# Patient Record
Sex: FEMALE | Race: WHITE | NOT HISPANIC OR LATINO
[De-identification: names, ages, dates, MRNs, and addresses within clinical notes are randomized per-mention and may not be internally consistent; named-entity substitution may affect disease eponyms.]

---

## 2017-05-22 ENCOUNTER — APPOINTMENT (OUTPATIENT)
Dept: DERMATOLOGY | Facility: CLINIC | Age: 62
End: 2017-05-22

## 2017-05-22 VITALS
HEIGHT: 62 IN | WEIGHT: 150 LBS | DIASTOLIC BLOOD PRESSURE: 78 MMHG | BODY MASS INDEX: 27.6 KG/M2 | SYSTOLIC BLOOD PRESSURE: 120 MMHG

## 2017-05-22 DIAGNOSIS — Z86.39 PERSONAL HISTORY OF OTHER ENDOCRINE, NUTRITIONAL AND METABOLIC DISEASE: ICD-10-CM

## 2017-05-22 DIAGNOSIS — Z78.9 OTHER SPECIFIED HEALTH STATUS: ICD-10-CM

## 2017-05-22 DIAGNOSIS — D48.5 NEOPLASM OF UNCERTAIN BEHAVIOR OF SKIN: ICD-10-CM

## 2017-05-22 DIAGNOSIS — Z56.0 UNEMPLOYMENT, UNSPECIFIED: ICD-10-CM

## 2017-05-22 DIAGNOSIS — Z87.891 PERSONAL HISTORY OF NICOTINE DEPENDENCE: ICD-10-CM

## 2017-05-22 DIAGNOSIS — F41.9 ANXIETY DISORDER, UNSPECIFIED: ICD-10-CM

## 2017-05-22 PROBLEM — Z00.00 ENCOUNTER FOR PREVENTIVE HEALTH EXAMINATION: Status: ACTIVE | Noted: 2017-05-22

## 2017-05-22 SDOH — ECONOMIC STABILITY - INCOME SECURITY: UNEMPLOYMENT, UNSPECIFIED: Z56.0

## 2018-07-22 PROBLEM — Z78.9 ALCOHOL USE: Status: ACTIVE | Noted: 2017-05-22

## 2023-03-01 ENCOUNTER — APPOINTMENT (OUTPATIENT)
Dept: ORTHOPEDIC SURGERY | Facility: CLINIC | Age: 68
End: 2023-03-01
Payer: MEDICARE

## 2023-03-01 VITALS — BODY MASS INDEX: 27.05 KG/M2 | HEIGHT: 62 IN | WEIGHT: 147 LBS

## 2023-03-01 DIAGNOSIS — S13.9XXA SPRAIN OF JOINTS AND LIGAMENTS OF UNSPECIFIED PARTS OF NECK, INITIAL ENCOUNTER: ICD-10-CM

## 2023-03-01 DIAGNOSIS — Z86.39 PERSONAL HISTORY OF OTHER ENDOCRINE, NUTRITIONAL AND METABOLIC DISEASE: ICD-10-CM

## 2023-03-01 PROCEDURE — 99204 OFFICE O/P NEW MOD 45 MIN: CPT

## 2023-03-01 PROCEDURE — 73030 X-RAY EXAM OF SHOULDER: CPT | Mod: RT

## 2023-03-01 PROCEDURE — 72040 X-RAY EXAM NECK SPINE 2-3 VW: CPT

## 2023-03-01 RX ORDER — MELOXICAM 15 MG/1
15 TABLET ORAL
Qty: 30 | Refills: 0 | Status: COMPLETED | COMMUNITY
Start: 2023-03-01 | End: 2023-03-31

## 2023-03-01 RX ORDER — THYROID,PORK 81.25 MG
81.25 TABLET ORAL
Refills: 0 | Status: COMPLETED | COMMUNITY
End: 2023-03-01

## 2023-03-01 RX ORDER — LEVOTHYROXINE SODIUM 0.17 MG/1
TABLET ORAL
Refills: 0 | Status: ACTIVE | COMMUNITY

## 2023-03-01 NOTE — HISTORY OF PRESENT ILLNESS
[Upper back] : upper back [Mid-back] : mid-back [Lower back] : lower back [Gradual] : gradual [Sudden] : sudden [4] : 4 [2] : 2 [Dull/Aching] : dull/aching [Sharp] : sharp [Stabbing] : stabbing [Constant] : constant [Household chores] : household chores [Leisure] : leisure [Sleep] : sleep [Meds] : meds [Ice] : ice [de-identified] : 3/1/23: 66 y/o RHD female c/o right shoulder pain since 12/2022. No specific injury, but has been busy with the holidays. Describes anterior shoulder pain that progresses with neck and upper pain as well. Denies numbness/tingling. Pain is worse with pressure to the arm or lifting. She is managing with Advil and topicals with some relief. Denies history of prior injury. [] : Post Surgical Visit: no [FreeTextEntry1] : Rt shoulder [FreeTextEntry5] : This is a RHD 66 y/o F here for right shoulder, neck, midback, and lower back eval. No prior surgeries. Previous injury to neck many years ago when pt slipped and fell onto back. Pain in right shoulder began in Dec 2022 with no specific injury. Pain radiates from shoulder down the arm and to the neck. No n/t.  [FreeTextEntry7] : from right shoulder down the arm, to neck [FreeTextEntry9] : Advil, Trolamine salicylate cream [de-identified] : activity [de-identified] : Chiropracter [de-identified] : none

## 2023-03-01 NOTE — PHYSICAL EXAM
[Rotation to right] : rotation to right [Right] : right shoulder [5 ___] : forward flexion 5[unfilled]/5 [5___] : internal rotation 5[unfilled]/5 [] : no sensory deficits [TWNoteComboBox7] : active forward flexion 160 degrees [TWNoteComboBox6] : internal rotation L2 [de-identified] : external rotation 80 degrees

## 2023-03-01 NOTE — ASSESSMENT
[FreeTextEntry1] : Underlying pathology reviewed and treatment options discussed. \par 03/01/2023 : xrays c-spine  ,2 views,  reveal DDD changes worse btwn C5-C6, C6-C7\par xrays of the right shoulder, 2 views, negative. \par If no improvement consider further imaging.\par Start PT and HEP to improve mechanics and reduce pain.\par Discussed OTC NSAIDs vs prescription meloxicam. \par Activity modifier as tolerated.  Prescribed Mobic.  Questions addressed.\par \par The documentation recorded by the scribe accurately reflects the service I personally performed and the decisions made by me.\par I, Nidia Masonibshahida, attest that this documentation has been prepared under the direction and in the presence of Provider Papo Cheema MD.\par \par The patient was seen by Papo Cheema MD.\par The following radiographs were ordered and read by me during this patient's visit. I reviewed each radiograph in detail with the patient, and discussed the findings as highlighted below.\par

## 2023-04-12 ENCOUNTER — APPOINTMENT (OUTPATIENT)
Dept: ORTHOPEDIC SURGERY | Facility: CLINIC | Age: 68
End: 2023-04-12

## 2023-04-17 ENCOUNTER — APPOINTMENT (OUTPATIENT)
Dept: ORTHOPEDIC SURGERY | Facility: CLINIC | Age: 68
End: 2023-04-17
Payer: MEDICARE

## 2023-04-17 VITALS — HEIGHT: 62 IN | WEIGHT: 147 LBS | BODY MASS INDEX: 27.05 KG/M2

## 2023-04-17 DIAGNOSIS — S33.5XXA SPRAIN OF LIGAMENTS OF LUMBAR SPINE, INITIAL ENCOUNTER: ICD-10-CM

## 2023-04-17 DIAGNOSIS — M54.50 LOW BACK PAIN, UNSPECIFIED: ICD-10-CM

## 2023-04-17 PROCEDURE — 99214 OFFICE O/P EST MOD 30 MIN: CPT

## 2023-04-17 RX ORDER — TIZANIDINE 4 MG/1
4 TABLET ORAL TWICE DAILY
Qty: 30 | Refills: 0 | Status: ACTIVE | COMMUNITY
Start: 2023-04-17

## 2023-04-17 RX ORDER — TIZANIDINE 4 MG/1
4 TABLET ORAL TWICE DAILY
Qty: 30 | Refills: 0 | Status: ACTIVE | COMMUNITY
Start: 2023-04-17 | End: 1900-01-01

## 2023-04-17 NOTE — ASSESSMENT
[FreeTextEntry1] : 68 yo F p/w 2 days of low back pain on an improving trend; ibuprofen/ topical volteran and tizanidine; pt defers PT- encouraged HEP, discussed exercises and activity modifications; if symptoms persist over the next 3-4 weeks will f/u with one of our spine colleagues in [place] would likely be referred for MRI LS. \par \par Patient seen by Jojo Huang PA-C under the supervision of Hiram Casey MD

## 2023-04-17 NOTE — HISTORY OF PRESENT ILLNESS
[Lower back] : lower back [Sudden] : sudden [9] : 9 [5] : 5 [Sharp] : sharp [Meds] : meds [Ice] : ice [Sitting] : sitting [Standing] : standing [Walking] : walking [Bending forward] : bending forward [de-identified] : 66 yo F presenting with low back pain x 4/15 after bending in the shower to wash leg. pain in the left side of the low back. no leg pain. no n/t. had a fall in the 90s and states she has had intermittent back pain and stiffness since then. has been in and out of chirocare of the years with temporary relief. no prior injections or spinal surgeries. advil, ibuprofen with partial relief. symptoms are on an improving trend. hx of osteopenia no recent DEXA [] : no [FreeTextEntry5] : RADHA SEAY is a 67 year old female presenting with lower back pain since Saturday 04/15/22. Pt not able to bend or get up after sitting.  [FreeTextEntry9] : Magnesium oil/ Ibuprofen

## 2023-04-17 NOTE — PHYSICAL EXAM
[] : non-antalgic [de-identified] : able to heel/toe walk [FreeTextEntry1] : signficant loss of disc height L5-S1; no obvious fractures

## 2023-04-19 ENCOUNTER — APPOINTMENT (OUTPATIENT)
Dept: ORTHOPEDIC SURGERY | Facility: CLINIC | Age: 68
End: 2023-04-19

## 2023-10-18 ENCOUNTER — APPOINTMENT (OUTPATIENT)
Dept: ORTHOPEDIC SURGERY | Facility: CLINIC | Age: 68
End: 2023-10-18
Payer: MEDICARE

## 2023-10-18 VITALS — BODY MASS INDEX: 27.05 KG/M2 | HEIGHT: 62 IN | WEIGHT: 147 LBS

## 2023-10-18 PROCEDURE — 99213 OFFICE O/P EST LOW 20 MIN: CPT

## 2023-10-21 ENCOUNTER — APPOINTMENT (OUTPATIENT)
Dept: MRI IMAGING | Facility: CLINIC | Age: 68
End: 2023-10-21

## 2023-10-25 ENCOUNTER — APPOINTMENT (OUTPATIENT)
Dept: ORTHOPEDIC SURGERY | Facility: CLINIC | Age: 68
End: 2023-10-25

## 2023-10-25 ENCOUNTER — APPOINTMENT (OUTPATIENT)
Dept: ORTHOPEDIC SURGERY | Facility: CLINIC | Age: 68
End: 2023-10-25
Payer: MEDICARE

## 2023-10-25 VITALS — BODY MASS INDEX: 27.05 KG/M2 | HEIGHT: 62 IN | WEIGHT: 147 LBS

## 2023-10-25 PROCEDURE — 99213 OFFICE O/P EST LOW 20 MIN: CPT

## 2023-11-08 ENCOUNTER — APPOINTMENT (OUTPATIENT)
Dept: ORTHOPEDIC SURGERY | Facility: CLINIC | Age: 68
End: 2023-11-08
Payer: MEDICARE

## 2023-11-08 DIAGNOSIS — M75.41 IMPINGEMENT SYNDROME OF RIGHT SHOULDER: ICD-10-CM

## 2023-11-08 DIAGNOSIS — M67.911 UNSPECIFIED DISORDER OF SYNOVIUM AND TENDON, RIGHT SHOULDER: ICD-10-CM

## 2023-11-08 DIAGNOSIS — M50.90 CERVICAL DISC DISORDER, UNSPECIFIED, UNSPECIFIED CERVICAL REGION: ICD-10-CM

## 2023-11-08 DIAGNOSIS — M50.20 OTHER CERVICAL DISC DISPLACEMENT, UNSPECIFIED CERVICAL REGION: ICD-10-CM

## 2023-11-08 PROCEDURE — 99213 OFFICE O/P EST LOW 20 MIN: CPT
